# Patient Record
Sex: FEMALE | Race: WHITE | NOT HISPANIC OR LATINO | Employment: FULL TIME | URBAN - METROPOLITAN AREA
[De-identification: names, ages, dates, MRNs, and addresses within clinical notes are randomized per-mention and may not be internally consistent; named-entity substitution may affect disease eponyms.]

---

## 2020-07-14 ENCOUNTER — VIRTUAL VISIT (OUTPATIENT)
Dept: FAMILY MEDICINE | Facility: OTHER | Age: 25
End: 2020-07-14

## 2020-07-17 NOTE — PROGRESS NOTES
"Date: 2020 18:12:41  Clinician: Tyrel Garcia  Clinician NPI: [id number]  Patient: Katheryn Jang  Patient : 1995  Patient Address: [address] [place] [id number]  Patient Phone: [phone]  Visit Protocol: URI  Patient Summary:  Katheryn is a 25 year old ( : 1995 ) female who initiated a Visit for COVID-19 (Coronavirus) evaluation and screening. When asked the question \"Please sign me up to receive news, health information and promotions. \", Katheryn responded \"No\".    Katheryn states her symptoms started 1-2 days ago.   Her symptoms consist of wheezing, malaise, chills, a sore throat, and myalgia. She is experiencing mild difficulty breathing with activities but can speak normally in full sentences. Katheryn also feels feverish but was unable to measure her temperature.   Symptom details     Sore throat: Katheryn reports having mild throat pain (1-3 on a 10 point pain scale), does not have exudate on her tonsils, and can swallow liquids. The lymph nodes in her neck are not enlarged. A rash has not appeared on the skin since the sore throat started.     Wheezing: Katheryn has not ever been diagnosed with asthma. Additional wheezing details as reported by the patient (free text): very mild only when I breathe deeply        Katheryn denies having nausea, teeth pain, ageusia, diarrhea, vomiting, rhinitis, ear pain, headache, enlarged lymph nodes, anosmia, facial pain or pressure, cough, and nasal congestion. She also denies having recent facial or sinus surgery in the past 60 days and taking antibiotic medication in the past month.   Precipitating events  Within the past week, Katheryn has not been exposed to someone with strep throat. She has not recently been exposed to someone with influenza. Katheryn has not been in close contact with any high risk individuals.   Pertinent COVID-19 (Coronavirus) information  In the past 14 days, Katheryn has not worked in a congregate living " setting.   She does not work or volunteer as healthcare worker or a  and does not work or volunteer in a healthcare facility.   Katheryn also has not lived in a congregate living setting in the past 14 days. She lives with a healthcare worker.   Katheryn has not had a close contact with a laboratory-confirmed COVID-19 patient within 14 days of symptom onset.   Pertinent medical history  Katheryn typically gets a yeast infection when she takes antibiotics. She is not sure if she has used fluconazole (Diflucan) to treat previous yeast infections.   Katheryn needs a return to work/school note.   Weight: 153 lbs   Katheryn does not smoke or use smokeless tobacco.   She denies pregnancy and denies breastfeeding. She has menstruated in the past month.   Weight: 153 lbs    MEDICATIONS: Flonase Allergy Relief nasal, Liletta intrauterine, ALLERGIES: NKDA  Clinician Response:  Dear Katheryn,   Your symptoms show that you may have coronavirus (COVID-19). This illness can cause fever, cough and trouble breathing. Many people get a mild case and get better on their own. Some people can get very sick.  What should I do?  We would like to test you for this virus.   1. Please call [phone] to schedule your visit. Explain that you were referred by [facility] to have a COVID-19 test. Be ready to share your [facility] visit ID number.  The following will serve as your written order for this COVID Test, ordered by me, for the indication of suspected COVID [Z20.828]: The test will be ordered in [employer], our electronic health record, after you are scheduled. It will show as ordered and authorized by Giuseppe Douglas MD.  Order: COVID-19 (Coronavirus) PCR for SYMPTOMATIC testing from [facility].      2. When it's time for your COVID test:  Stay at least 6 feet away from others. (If someone will drive you to your test, stay in the backseat, as far away from the  as you can.)   Cover your mouth and nose with a mask, tissue or  "washcloth.  Go straight to the testing site. Don't make any stops on the way there or back.      3.Starting now: Stay home and away from others (self-isolate) until:   You've had no fever---and no medicine that reduces fever---for 3 full days (72 hours). And...   Your other symptoms have gotten better. For example, your cough or breathing has improved. And...   At least 10 days have passed since your symptoms started.       During this time, don't leave the house except for testing or medical care.   Stay in your own room, even for meals. Use your own bathroom if you can.   Stay away from others in your home. No hugging, kissing or shaking hands. No visitors.  Don't go to work, school or anywhere else.    Clean \"high touch\" surfaces often (doorknobs, counters, handles, etc.). Use a household cleaning spray or wipes. You'll find a full list of  on the EPA website: www.epa.gov/pesticide-registration/list-n-disinfectants-use-against-sars-cov-2.   Cover your mouth and nose with a mask, tissue or washcloth to avoid spreading germs.  Wash your hands and face often. Use soap and water.  Caregivers in these groups are at risk for severe illness due to COVID-19:  o People 65 years and older  o People who live in a nursing home or long-term care facility  o People with chronic disease (lung, heart, cancer, diabetes, kidney, liver, immunologic)  o People who have a weakened immune system, including those who:   Are in cancer treatment  Take medicine that weakens the immune system, such as corticosteroids  Had a bone marrow or organ transplant  Have an immune deficiency  Have poorly controlled HIV or AIDS  Are obese (body mass index of 40 or higher)  Smoke regularly   o Caregivers should wear gloves while washing dishes, handling laundry and cleaning bedrooms and bathrooms.  o Use caution when washing and drying laundry: Don't shake dirty laundry, and use the warmest water setting that you can.  o For more tips, go to " www.cdc.gov/coronavirus/2019-ncov/downloads/10Things.pdf.    4.Sign up for [employer]. We know it's scary to hear that you might have COVID-19. We want to track your symptoms to make sure you're okay over the next 2 weeks. Please look for an email from [employer]---this is a free, online program that we'll use to keep in touch. To sign up, follow the link in the email. Learn more at [email or web address]  How can I take care of myself?   Get lots of rest. Drink extra fluids (unless a doctor has told you not to).   Take Tylenol (acetaminophen) for fever or pain. If you have liver or kidney problems, ask your family doctor if it's okay to take Tylenol.   Adults can take either:    650 mg (two 325 mg pills) every 4 to 6 hours, or...   1,000 mg (two 500 mg pills) every 8 hours as needed.    Note: Don't take more than 3,000 mg in one day. Acetaminophen is found in many medicines (both prescribed and over-the-counter medicines). Read all labels to be sure you don't take too much.   For children, check the Tylenol bottle for the right dose. The dose is based on the child's age or weight.    If you have other health problems (like cancer, heart failure, an organ transplant or severe kidney disease): Call your specialty clinic if you don't feel better in the next 2 days.       Know when to call 911. Emergency warning signs include:    Trouble breathing or shortness of breath Pain or pressure in the chest that doesn't go away Feeling confused like you haven't felt before, or not being able to wake up Bluish-colored lips or face.  Where can I get more information?    cloud[employer] [facility] -- About COVID-19: [email or web address]   CDC -- What to Do If You're Sick: www.cdc.gov/coronavirus/2019-ncov/about/steps-when-sick.html   CDC -- Ending Home Isolation: www.cdc.gov/coronavirus/2019-ncov/hcp/disposition-in-home-patients.html   CDC -- Caring for Someone:  www.cdc.gov/coronavirus/2019-ncov/if-you-are-sick/care-for-someone.html   [facility] -- Interim Guidance for Hospital Discharge to Home: www.health.[facility].mn.us/diseases/coronavirus/hcp/hospdischarge.pdf   [facility] clinical trials (COVID-19 research studies): clinicalaffairs.[facility]-clinical-trials    Below are the COVID-19 hotlines at the [place] Department of Health ([facility]). Interpreters are available.    For health questions: Call 010-692-8573 or 1-799.323.7795 (7 a.m. to 7 p.m.) For questions about schools and childcare: Call 013-920-6991 or 1-797.396.4274 (7 a.m. to 7 p.m.)       [facility] Strep [facility]    I am sorry you are not feeling well. Your strep test has . Based on the information provided, it is possible that you could have strep throat. When left untreated, strep can spread to other areas of the body and cause a more serious infection.  A strep test is the only way to determine if a bacterial infection or a virus is causing your sore throat. This is done in a lab where a swab of the back of your throat is collected tested for the bacteria that causes strep.  Since you chose not to use the lab order, please be seen:     In a clinic or urgent care    Within 24 hours     Call 081 or go to the emergency room if you suddenly develop a rash, are drooling or unable to swallow fluids, if you are having difficulty breathing, or feel that your throat is closing off.   Diagnosis: Pain in throat  Diagnosis ICD: R07.0  [facility] Results: [facility] -   [facility] Secondary Results: null

## 2022-11-03 ENCOUNTER — HOSPITAL ENCOUNTER (OUTPATIENT)
Facility: CLINIC | Age: 27
End: 2022-11-03
Admitting: SPECIALIST

## 2022-11-03 ENCOUNTER — HOSPITAL ENCOUNTER (OUTPATIENT)
Facility: CLINIC | Age: 27
Discharge: HOME | End: 2022-11-03
Attending: SPECIALIST | Admitting: SPECIALIST
Payer: COMMERCIAL

## 2022-11-03 VITALS
RESPIRATION RATE: 16 BRPM | WEIGHT: 154 LBS | BODY MASS INDEX: 24.17 KG/M2 | TEMPERATURE: 98.4 F | DIASTOLIC BLOOD PRESSURE: 58 MMHG | SYSTOLIC BLOOD PRESSURE: 105 MMHG | HEIGHT: 67 IN | HEART RATE: 77 BPM

## 2022-11-03 LAB
ALBUMIN SERPL-MCNC: 3.9 G/DL (ref 3.4–5)
ALP SERPL-CCNC: 40 U/L (ref 40–150)
ALT SERPL W P-5'-P-CCNC: 29 U/L (ref 0–50)
ANION GAP SERPL CALCULATED.3IONS-SCNC: 3 MMOL/L (ref 3–14)
AST SERPL W P-5'-P-CCNC: 12 U/L (ref 0–45)
B-HCG SERPL-ACNC: 574 IU/L (ref 0–5)
BILIRUB SERPL-MCNC: 0.9 MG/DL (ref 0.2–1.3)
BUN SERPL-MCNC: 11 MG/DL (ref 7–30)
CALCIUM SERPL-MCNC: 8.9 MG/DL (ref 8.5–10.1)
CHLORIDE BLD-SCNC: 104 MMOL/L (ref 94–109)
CO2 SERPL-SCNC: 31 MMOL/L (ref 20–32)
CREAT SERPL-MCNC: 0.82 MG/DL (ref 0.52–1.04)
ERYTHROCYTE [DISTWIDTH] IN BLOOD BY AUTOMATED COUNT: 12 % (ref 10–15)
GFR SERPL CREATININE-BSD FRML MDRD: >90 ML/MIN/1.73M2
GLUCOSE BLD-MCNC: 96 MG/DL (ref 70–99)
HCT VFR BLD AUTO: 37.9 % (ref 35–47)
HGB BLD-MCNC: 13.5 G/DL (ref 11.7–15.7)
MCH RBC QN AUTO: 30.6 PG (ref 26.5–33)
MCHC RBC AUTO-ENTMCNC: 35.6 G/DL (ref 31.5–36.5)
MCV RBC AUTO: 86 FL (ref 78–100)
PLATELET # BLD AUTO: 173 10E3/UL (ref 150–450)
POTASSIUM BLD-SCNC: 3.7 MMOL/L (ref 3.4–5.3)
PROT SERPL-MCNC: 6.9 G/DL (ref 6.8–8.8)
RBC # BLD AUTO: 4.41 10E6/UL (ref 3.8–5.2)
SODIUM SERPL-SCNC: 138 MMOL/L (ref 133–144)
WBC # BLD AUTO: 5.3 10E3/UL (ref 4–11)

## 2022-11-03 PROCEDURE — 80053 COMPREHEN METABOLIC PANEL: CPT | Performed by: SPECIALIST

## 2022-11-03 PROCEDURE — 85027 COMPLETE CBC AUTOMATED: CPT | Performed by: SPECIALIST

## 2022-11-03 PROCEDURE — 96401 CHEMO ANTI-NEOPL SQ/IM: CPT

## 2022-11-03 PROCEDURE — 36415 COLL VENOUS BLD VENIPUNCTURE: CPT | Performed by: SPECIALIST

## 2022-11-03 PROCEDURE — 84702 CHORIONIC GONADOTROPIN TEST: CPT | Performed by: SPECIALIST

## 2022-11-03 PROCEDURE — 250N000011 HC RX IP 250 OP 636: Performed by: SPECIALIST

## 2022-11-03 RX ORDER — FEXOFENADINE HCL 180 MG/1
180 TABLET ORAL DAILY
COMMUNITY

## 2022-11-03 RX ORDER — METHOTREXATE 25 MG/ML
50 INJECTION INTRA-ARTERIAL; INTRAMUSCULAR; INTRATHECAL; INTRAVENOUS ONCE
Status: COMPLETED | OUTPATIENT
Start: 2022-11-03 | End: 2022-11-03

## 2022-11-03 RX ADMIN — METHOTREXATE 91 MG: 25 SOLUTION INTRA-ARTERIAL; INTRAMUSCULAR; INTRATHECAL; INTRAVENOUS at 16:36

## 2022-11-03 ASSESSMENT — ACTIVITIES OF DAILY LIVING (ADL)
ADLS_ACUITY_SCORE: 31
ADLS_ACUITY_SCORE: 31

## 2022-11-03 NOTE — PLAN OF CARE
Methotrexate given per order. discharge instructions given and reviewed with pt and spouse. Questions answered. Verbalized understanding. Will F/U in clinic oon Monday for repeat Hcg. discharge home after 15 minutes of observation. See flow sheets

## 2022-11-03 NOTE — CARE PLAN
Katheryn presents to [facility] ambulatory for administration of Methotrexate injection, accompanied by spouse. Orders received by Dr Rowan. AVSS. Awaiting laboratory results for dosing. Plan of care reviewed with patient.

## 2022-11-03 NOTE — DISCHARGE INSTRUCTIONS
Methotrexate for Ectopic Pregnancy  An ectopic pregnancy means the baby is growing in the outside of the uterus, most commonly in the fallopian tube. The fetus can't survive in the fallopian tube. There is no way to save the fetus in this situation. An ectopic pregnancy is a very serious condition. It can lead to severe internal bleeding as the growing fetus tears the fallopian tube. It can also threaten the life of the mother.  Methotrexate is a medicine given  for ectopic pregnancy. The medicine stops the fetus from growing. The mother's body then usually absorbs the fetal tissue. Methotrexate is an alternative to surgery. The advantage of this treatment is that it avoids the risks of surgery. These risks include bleeding, infection, injury to the body, and the side effects of anesthesia. The surgery removes the fetus from the fallopian tube or removes the fallopian tube itself. If surgery removes the fetus only, you may have scarring of the fallopian tube and infertility.  Methotrexate is given by injection. After treatment, you may have mild abdominal pain or cramping. Some women also have nausea and vomiting, diarrhea, or fatigue.  There are some cases when you can't use methotrexate. Be sure to tell your healthcare provider if you have other health conditions, especially peptic ulcers, pulmonary diseases, kidney disease, or liver disease.  You will have your blood taken several times in the weeks after the methotrexate injection. This is to make sure your pregnancy hormone level (HCG) is getting lower. This shows that the pregnancy has ended and the fetus is no longer growing. It may take about 4 weeks for your level to drop to zero.  Most women need only one injection. But you may need more than one, depending on your situation.  Home care    These guidelines will help you care for yourself at home:  You may resume normal activities if you don t have heavy bleeding or pain.  To prevent infection during  treatment and until the bleeding completely stops:  Don t have sex for as long as your healthcare provider tells you.  Use sanitary pads instead of tampons.  Don t douche.  Don t use alcohol, any vitamins with folic acid (vitamin B-9), or penicillin until your HCG level is back to zero.  Don t take aspirin or other anti-inflammatory medicines such as ibuprofen and naproxen for 1 week after methotrexate treatment or until your healthcare provider says it s OK.  You may use acetaminophen to control pain, unless another pain-relieving medicine was prescribed. Talk with your provider before using these medicines if you have chronic liver disease.  Don't eat gas-producing foods such as beans and cabbage. These might make abdominal pain worse.  Stay out of the sun during treatment. Methotrexate may cause you to be sensitive to the sun.  Use birth control for 4 to 6 months after treatment or as long as your healthcare provider tells you to.  If you feel sadness or grief after pregnancy loss, it may help to talk about your feelings with family and friends, or with a counselor.  Follow-up care  Follow up with your healthcare provider, or as advised for repeat HCG blood testing.  When to seek medical advice  Call your healthcare provider right away if any of these occur:  Pain in your lower abdomen that gets worse  Heavy vaginal bleeding (soaking 1 new pad an hour over 3 hours)  Repeated vomiting or unable to keep down fluids because of nausea  Dizziness, weakness, or fainting  Fever of 100.4 F (38 C) or higher, or as directed by your healthcare provider  James last reviewed this educational content on 9/1/2019 2000-2021 [employer]. All rights reserved. This information is not intended as a substitute for professional medical care. Always follow your healthcare professional's instructions.

## 2024-02-14 LAB
HEPATITIS B SURFACE ANTIGEN (EXTERNAL): NEGATIVE
RUBELLA ANTIBODY IGG (EXTERNAL): NORMAL

## 2024-09-03 LAB — GROUP B STREPTOCOCCUS (EXTERNAL): NEGATIVE

## 2024-09-16 ENCOUNTER — HOSPITAL ENCOUNTER (INPATIENT)
Facility: CLINIC | Age: 29
LOS: 4 days | Discharge: HOME HEALTH | End: 2024-09-20
Attending: SPECIALIST | Admitting: SPECIALIST
Payer: COMMERCIAL

## 2024-09-16 PROBLEM — O13.3 GESTATIONAL HYPERTENSION, THIRD TRIMESTER: Status: ACTIVE | Noted: 2024-09-16

## 2024-09-16 LAB
ABO/RH(D): NORMAL
ALT SERPL W P-5'-P-CCNC: 25 U/L (ref 0–50)
ANTIBODY SCREEN: NEGATIVE
AST SERPL W P-5'-P-CCNC: 32 U/L (ref 0–45)
ERYTHROCYTE [DISTWIDTH] IN BLOOD BY AUTOMATED COUNT: 12.5 % (ref 10–15)
HCT VFR BLD AUTO: 36.6 % (ref 35–47)
HGB BLD-MCNC: 12.6 G/DL (ref 11.7–15.7)
MCH RBC QN AUTO: 30.2 PG (ref 26.5–33)
MCHC RBC AUTO-ENTMCNC: 34.4 G/DL (ref 31.5–36.5)
MCV RBC AUTO: 88 FL (ref 78–100)
PLATELET # BLD AUTO: 116 10E3/UL (ref 150–450)
RBC # BLD AUTO: 4.17 10E6/UL (ref 3.8–5.2)
SPECIMEN EXPIRATION DATE: NORMAL
WBC # BLD AUTO: 8 10E3/UL (ref 4–11)

## 2024-09-16 PROCEDURE — 84450 TRANSFERASE (AST) (SGOT): CPT | Performed by: SPECIALIST

## 2024-09-16 PROCEDURE — 84460 ALANINE AMINO (ALT) (SGPT): CPT | Performed by: SPECIALIST

## 2024-09-16 PROCEDURE — 120N000001 HC R&B MED SURG/OB

## 2024-09-16 PROCEDURE — 86900 BLOOD TYPING SEROLOGIC ABO: CPT | Performed by: SPECIALIST

## 2024-09-16 PROCEDURE — 250N000013 HC RX MED GY IP 250 OP 250 PS 637: Performed by: SPECIALIST

## 2024-09-16 PROCEDURE — 36415 COLL VENOUS BLD VENIPUNCTURE: CPT | Performed by: SPECIALIST

## 2024-09-16 PROCEDURE — 85027 COMPLETE CBC AUTOMATED: CPT | Performed by: SPECIALIST

## 2024-09-16 RX ORDER — ONDANSETRON 4 MG/1
4 TABLET, ORALLY DISINTEGRATING ORAL EVERY 6 HOURS PRN
Status: DISCONTINUED | OUTPATIENT
Start: 2024-09-16 | End: 2024-09-18 | Stop reason: HOSPADM

## 2024-09-16 RX ORDER — CITRIC ACID/SODIUM CITRATE 334-500MG
30 SOLUTION, ORAL ORAL
Status: DISCONTINUED | OUTPATIENT
Start: 2024-09-16 | End: 2024-09-18 | Stop reason: HOSPADM

## 2024-09-16 RX ORDER — MISOPROSTOL 100 UG/1
25 TABLET ORAL
Status: DISCONTINUED | OUTPATIENT
Start: 2024-09-16 | End: 2024-09-16

## 2024-09-16 RX ORDER — LOPERAMIDE HCL 2 MG
4 CAPSULE ORAL
Status: DISCONTINUED | OUTPATIENT
Start: 2024-09-16 | End: 2024-09-18 | Stop reason: HOSPADM

## 2024-09-16 RX ORDER — NALOXONE HYDROCHLORIDE 0.4 MG/ML
0.4 INJECTION, SOLUTION INTRAMUSCULAR; INTRAVENOUS; SUBCUTANEOUS
Status: DISCONTINUED | OUTPATIENT
Start: 2024-09-16 | End: 2024-09-18 | Stop reason: HOSPADM

## 2024-09-16 RX ORDER — MISOPROSTOL 200 UG/1
800 TABLET ORAL
Status: DISCONTINUED | OUTPATIENT
Start: 2024-09-16 | End: 2024-09-18 | Stop reason: HOSPADM

## 2024-09-16 RX ORDER — OXYTOCIN 10 [USP'U]/ML
10 INJECTION, SOLUTION INTRAMUSCULAR; INTRAVENOUS
Status: DISCONTINUED | OUTPATIENT
Start: 2024-09-16 | End: 2024-09-18 | Stop reason: HOSPADM

## 2024-09-16 RX ORDER — TRANEXAMIC ACID 10 MG/ML
1 INJECTION, SOLUTION INTRAVENOUS EVERY 30 MIN PRN
Status: DISCONTINUED | OUTPATIENT
Start: 2024-09-16 | End: 2024-09-18 | Stop reason: HOSPADM

## 2024-09-16 RX ORDER — MISOPROSTOL 200 UG/1
400 TABLET ORAL
Status: DISCONTINUED | OUTPATIENT
Start: 2024-09-16 | End: 2024-09-18 | Stop reason: HOSPADM

## 2024-09-16 RX ORDER — METOCLOPRAMIDE HYDROCHLORIDE 5 MG/ML
10 INJECTION INTRAMUSCULAR; INTRAVENOUS EVERY 6 HOURS PRN
Status: DISCONTINUED | OUTPATIENT
Start: 2024-09-16 | End: 2024-09-18 | Stop reason: HOSPADM

## 2024-09-16 RX ORDER — OXYTOCIN 10 [USP'U]/ML
10 INJECTION, SOLUTION INTRAMUSCULAR; INTRAVENOUS
Status: DISCONTINUED | OUTPATIENT
Start: 2024-09-16 | End: 2024-09-20 | Stop reason: HOSPADM

## 2024-09-16 RX ORDER — LABETALOL 100 MG/1
100 TABLET, FILM COATED ORAL 3 TIMES DAILY
Status: ON HOLD | COMMUNITY
End: 2024-09-20

## 2024-09-16 RX ORDER — METOCLOPRAMIDE 10 MG/1
10 TABLET ORAL EVERY 6 HOURS PRN
Status: DISCONTINUED | OUTPATIENT
Start: 2024-09-16 | End: 2024-09-18 | Stop reason: HOSPADM

## 2024-09-16 RX ORDER — CALCIUM CARBONATE 500 MG/1
500 TABLET, CHEWABLE ORAL 3 TIMES DAILY PRN
Status: DISCONTINUED | OUTPATIENT
Start: 2024-09-16 | End: 2024-09-20 | Stop reason: HOSPADM

## 2024-09-16 RX ORDER — PROCHLORPERAZINE MALEATE 10 MG
10 TABLET ORAL EVERY 6 HOURS PRN
Status: DISCONTINUED | OUTPATIENT
Start: 2024-09-16 | End: 2024-09-18 | Stop reason: HOSPADM

## 2024-09-16 RX ORDER — ONDANSETRON 2 MG/ML
4 INJECTION INTRAMUSCULAR; INTRAVENOUS EVERY 6 HOURS PRN
Status: DISCONTINUED | OUTPATIENT
Start: 2024-09-16 | End: 2024-09-18 | Stop reason: HOSPADM

## 2024-09-16 RX ORDER — OXYTOCIN/0.9 % SODIUM CHLORIDE 30/500 ML
340 PLASTIC BAG, INJECTION (ML) INTRAVENOUS CONTINUOUS PRN
Status: DISCONTINUED | OUTPATIENT
Start: 2024-09-16 | End: 2024-09-18 | Stop reason: HOSPADM

## 2024-09-16 RX ORDER — SODIUM CHLORIDE, SODIUM LACTATE, POTASSIUM CHLORIDE, CALCIUM CHLORIDE 600; 310; 30; 20 MG/100ML; MG/100ML; MG/100ML; MG/100ML
INJECTION, SOLUTION INTRAVENOUS CONTINUOUS
Status: DISCONTINUED | OUTPATIENT
Start: 2024-09-16 | End: 2024-09-18 | Stop reason: HOSPADM

## 2024-09-16 RX ORDER — KETOROLAC TROMETHAMINE 30 MG/ML
30 INJECTION, SOLUTION INTRAMUSCULAR; INTRAVENOUS
Status: COMPLETED | OUTPATIENT
Start: 2024-09-16 | End: 2024-09-18

## 2024-09-16 RX ORDER — METHYLERGONOVINE MALEATE 0.2 MG/ML
200 INJECTION INTRAVENOUS
Status: DISCONTINUED | OUTPATIENT
Start: 2024-09-16 | End: 2024-09-18 | Stop reason: HOSPADM

## 2024-09-16 RX ORDER — IBUPROFEN 400 MG/1
800 TABLET, FILM COATED ORAL
Status: COMPLETED | OUTPATIENT
Start: 2024-09-16 | End: 2024-09-18

## 2024-09-16 RX ORDER — CARBOPROST TROMETHAMINE 250 UG/ML
250 INJECTION, SOLUTION INTRAMUSCULAR
Status: DISCONTINUED | OUTPATIENT
Start: 2024-09-16 | End: 2024-09-18 | Stop reason: HOSPADM

## 2024-09-16 RX ORDER — LABETALOL 100 MG/1
100 TABLET, FILM COATED ORAL EVERY 8 HOURS SCHEDULED
Status: DISCONTINUED | OUTPATIENT
Start: 2024-09-16 | End: 2024-09-18

## 2024-09-16 RX ORDER — NALOXONE HYDROCHLORIDE 0.4 MG/ML
0.2 INJECTION, SOLUTION INTRAMUSCULAR; INTRAVENOUS; SUBCUTANEOUS
Status: DISCONTINUED | OUTPATIENT
Start: 2024-09-16 | End: 2024-09-18 | Stop reason: HOSPADM

## 2024-09-16 RX ORDER — PANTOPRAZOLE SODIUM 40 MG/1
40 TABLET, DELAYED RELEASE ORAL EVERY EVENING
Status: DISCONTINUED | OUTPATIENT
Start: 2024-09-16 | End: 2024-09-20 | Stop reason: HOSPADM

## 2024-09-16 RX ORDER — HYDROXYZINE HYDROCHLORIDE 25 MG/1
50 TABLET, FILM COATED ORAL
Status: DISCONTINUED | OUTPATIENT
Start: 2024-09-16 | End: 2024-09-18 | Stop reason: HOSPADM

## 2024-09-16 RX ORDER — LOPERAMIDE HCL 2 MG
2 CAPSULE ORAL
Status: DISCONTINUED | OUTPATIENT
Start: 2024-09-16 | End: 2024-09-18 | Stop reason: HOSPADM

## 2024-09-16 RX ORDER — PROCHLORPERAZINE 25 MG
25 SUPPOSITORY, RECTAL RECTAL EVERY 12 HOURS PRN
Status: DISCONTINUED | OUTPATIENT
Start: 2024-09-16 | End: 2024-09-18 | Stop reason: HOSPADM

## 2024-09-16 RX ORDER — OMEPRAZOLE 10 MG/1
10 CAPSULE, DELAYED RELEASE ORAL DAILY
Status: ON HOLD | COMMUNITY
End: 2024-09-20

## 2024-09-16 RX ORDER — MISOPROSTOL 100 UG/1
25 TABLET ORAL EVERY 4 HOURS PRN
Status: COMPLETED | OUTPATIENT
Start: 2024-09-16 | End: 2024-09-17

## 2024-09-16 RX ORDER — OXYTOCIN/0.9 % SODIUM CHLORIDE 30/500 ML
100-340 PLASTIC BAG, INJECTION (ML) INTRAVENOUS CONTINUOUS PRN
Status: DISCONTINUED | OUTPATIENT
Start: 2024-09-16 | End: 2024-09-20 | Stop reason: HOSPADM

## 2024-09-16 RX ADMIN — MISOPROSTOL 25 MCG: 100 TABLET ORAL at 21:00

## 2024-09-16 RX ADMIN — LABETALOL HYDROCHLORIDE 100 MG: 100 TABLET, FILM COATED ORAL at 22:04

## 2024-09-16 RX ADMIN — HYDROXYZINE HYDROCHLORIDE 50 MG: 25 TABLET, FILM COATED ORAL at 22:05

## 2024-09-16 RX ADMIN — PANTOPRAZOLE SODIUM 40 MG: 40 TABLET, DELAYED RELEASE ORAL at 22:05

## 2024-09-16 ASSESSMENT — ACTIVITIES OF DAILY LIVING (ADL)
ADLS_ACUITY_SCORE: 35
ADLS_ACUITY_SCORE: 18
ADLS_ACUITY_SCORE: 35
ADLS_ACUITY_SCORE: 18

## 2024-09-17 PROCEDURE — 120N000001 HC R&B MED SURG/OB

## 2024-09-17 PROCEDURE — 250N000013 HC RX MED GY IP 250 OP 250 PS 637: Performed by: SPECIALIST

## 2024-09-17 PROCEDURE — 258N000003 HC RX IP 258 OP 636: Performed by: SPECIALIST

## 2024-09-17 PROCEDURE — 250N000009 HC RX 250: Performed by: SPECIALIST

## 2024-09-17 RX ORDER — OXYTOCIN/0.9 % SODIUM CHLORIDE 30/500 ML
1-24 PLASTIC BAG, INJECTION (ML) INTRAVENOUS CONTINUOUS
Status: DISCONTINUED | OUTPATIENT
Start: 2024-09-17 | End: 2024-09-18 | Stop reason: HOSPADM

## 2024-09-17 RX ORDER — SODIUM CHLORIDE, SODIUM LACTATE, POTASSIUM CHLORIDE, CALCIUM CHLORIDE 600; 310; 30; 20 MG/100ML; MG/100ML; MG/100ML; MG/100ML
INJECTION, SOLUTION INTRAVENOUS CONTINUOUS PRN
Status: DISCONTINUED | OUTPATIENT
Start: 2024-09-17 | End: 2024-09-18 | Stop reason: HOSPADM

## 2024-09-17 RX ADMIN — SODIUM CHLORIDE, POTASSIUM CHLORIDE, SODIUM LACTATE AND CALCIUM CHLORIDE: 600; 310; 30; 20 INJECTION, SOLUTION INTRAVENOUS at 22:06

## 2024-09-17 RX ADMIN — MISOPROSTOL 25 MCG: 100 TABLET ORAL at 01:00

## 2024-09-17 RX ADMIN — MISOPROSTOL 25 MCG: 100 TABLET ORAL at 18:04

## 2024-09-17 RX ADMIN — MISOPROSTOL 25 MCG: 100 TABLET ORAL at 13:53

## 2024-09-17 RX ADMIN — HYDROXYZINE HYDROCHLORIDE 50 MG: 25 TABLET, FILM COATED ORAL at 22:02

## 2024-09-17 RX ADMIN — PANTOPRAZOLE SODIUM 40 MG: 40 TABLET, DELAYED RELEASE ORAL at 22:03

## 2024-09-17 RX ADMIN — HYDROXYZINE HYDROCHLORIDE 50 MG: 25 TABLET, FILM COATED ORAL at 01:10

## 2024-09-17 RX ADMIN — MISOPROSTOL 25 MCG: 100 TABLET ORAL at 04:58

## 2024-09-17 RX ADMIN — MISOPROSTOL 25 MCG: 100 TABLET ORAL at 09:17

## 2024-09-17 RX ADMIN — LABETALOL HYDROCHLORIDE 100 MG: 100 TABLET, FILM COATED ORAL at 13:54

## 2024-09-17 RX ADMIN — Medication 2 MILLI-UNITS/MIN: at 22:07

## 2024-09-17 ASSESSMENT — ACTIVITIES OF DAILY LIVING (ADL)
ADLS_ACUITY_SCORE: 18

## 2024-09-17 NOTE — H&P
"  2024    Katheryn Brennan  [id number]            OB Admit History & Physical      Ms. Brennan  is here for induction of labor.  Indication gestational hypertension.    She has noticed some lower pelvic cramping and frequent bowel movements today.     No LMP recorded.   Her Estimated Date of Delivery: Data Unavailable  , making her Unknown  wks.     EDC 10/4/24    Diagnosed with gestational hypertension at 33 weeks.   Blood pressures well managed with labetalol 100 mg tid.   Most recent US demonstrated growth at 27%.    Estimated body mass index is 24.12 kg/m  as calculated from the following:    Height as of 11/3/22: 1.702 m (5' 7\").    Weight as of 11/3/22: 69.9 kg (154 lb).  Her prenatal course has been  complicated by gestational hypertension. .  Also with marginal cord insertion on 20 week scan.     See prenatal for labs.  neg GBBS, Rubella  Immune, RH pos    Estimated fetal weight= 3000 Wigm       She is a 29 year old   Her OB history:   OB History    Para Term  AB Living   1 0 0 0 0 0   SAB IAB Ectopic Multiple Live Births   0 0 0 0 0      # Outcome Date GA Lbr Osorio/2nd Weight Sex Type Anes PTL Lv   1                    No past medical history on file.       Past Surgical History:   Procedure Laterality Date    ENT SURGERY      Sinus and wisdom teetch    ORTHOPEDIC SURGERY Left     acl repair         No current outpatient medications on file.       Allergies: Patient has no known allergies.      REVIEW OF SYSTEMS:  NEUROLOGIC:  Negative  EYES:  Negative  ENT:  Negative  GI:  Negative  BREAST:  Negative  :  Negative  GYN:  Negative  CV:  Negative  PULMONARY:  Negative  MUSCULOSKELETAL:  Negative  PSYCH:  Negative        Social History     Socioeconomic History    Marital status:      Spouse name: Not on file    Number of children: Not on file    Years of education: Not on file    Highest education level: Not on file   Occupational History    Not on file "   Tobacco Use    Smoking status: Never    Smokeless tobacco: Never   Substance and Sexual Activity    Alcohol use: Yes    Drug use: Never    Sexual activity: Yes   Other Topics Concern    Not on file   Social History Narrative    Not on file     Social Determinants of Health     Financial Resource Strain: Not on file   Food Insecurity: Not on file   Transportation Needs: Not on file   Physical Activity: Not on file   Stress: Not on file   Social Connections: Not on file   Interpersonal Safety: Not on file   Housing Stability: Not on file      No family history on file.          Vitals:   's  With minimal contractions    Alert Awake in NAD  HEENT grossly normal  Neck: no lymphadenopathy or thryoidomegaly  Lungs WNL  Back no spinal or CVAT  Heart WNL  ABD gravid, vertex on exam   Pelvic:  no fluid noted, no blood noted  Cervix is closed/60-2 cm   EXT:  no edema or calf tenderness  Neuro:  WNL    Assessment:  IUP at 37 3/7 weeks gestation  Gestational hypertension.     Plan:  Will begin vaginal cytotec for ripening.   Preeclampsia labs.       Gia Barba MD MD  Dept of OB/GYN  September 16, 2024

## 2024-09-17 NOTE — PROVIDER NOTIFICATION
09/17/24 1810   Provider Notification   Provider Name/Title MD Barba   Method of Notification Electronic Page   Request Evaluate - Remote   Notification Reason Status Update;SVE;Labor Status;Uterine Activity     MD Barba updated on pt condition- SVE remains 1/60/-2, RN gave last dose of vaginal misoprostol. Pt zeferino Q4 min, states that they are becoming slightly more painful, however still able to sleep through them. Having minor bloody show when wiping. VSS on RA. Cat I FHR tracing

## 2024-09-17 NOTE — PLAN OF CARE
"3rd dose vaginal Cytotec given at 0500. Ctx on toco are 2-4 min. Ctx palpate mild. Pt states feeling intermittent back pain and mild \"period like\" ctx. Cat 1 FHT's. Accels, no decels with mod variability.     Patient states being able to sleep very well throughout the night.   "

## 2024-09-17 NOTE — PROGRESS NOTES
Labor Progress Note  S: Sleeping presently.   Has received 3 doses of vaginal cytotec.   Feeling mild cramping.     O./60   Temp 97.9  F (36.6  C) (Temporal)   Resp 16   SpO2 95%       Abd: soft, non tneder      Jeddito: contractions q2-3 min       Category 1 tracing.        Plt 116       Hgb 12.6       AST 32       ALT 25         Cervix closed per RN exam           A. Gestational hypertension.        37 4/7 weeks gestation    P.   Continue with vaginal cytotec.     Gia Barba MD on 9/17/2024 at 7:31 AM

## 2024-09-17 NOTE — PLAN OF CARE
Patient admitted to room 215 at 1930. Patient is a  at 37w3d. Prenatal record reviewed. Patient presents for cervical ripening due to Gest HTN. Vital signs per doc flowsheet. Fetal movement present. Support person Efren present.    Verbal consent for EFM, external fetal monitors applied. Admission assessment completed. Patient and support persons educated on labor process. Patient instructed to report change in fetal movement, contractions, vaginal leaking of fluid or bleeding, abdominal pain, or any concerns related to the pregnancy to her nurse/physician. Patient oriented to room, call light in reach.      Dr. Barba at bedside at 1950 for SVE and  to discuss plan of care. Plan per provider is Vaginal cytotec. Patient verbalized understanding of education and verbalized agreement with plan.

## 2024-09-17 NOTE — PROVIDER NOTIFICATION
09/17/24 0935   Provider Notification   Provider Name/Title MD Barba   Method of Notification Electronic Page   Notification Reason Status Update     MD Barba updated on pt condition- SVE 1/60/-2, just received 4th dose of vaginal misoprostol. Pt tolerating contractions well, states that they feel like minor period cramps. FHR tracing has been Cat I throughout morning. Pt up and eating breakfast.

## 2024-09-18 ENCOUNTER — ANESTHESIA (OUTPATIENT)
Dept: OBGYN | Facility: CLINIC | Age: 29
End: 2024-09-18
Payer: COMMERCIAL

## 2024-09-18 ENCOUNTER — ANESTHESIA EVENT (OUTPATIENT)
Dept: OBGYN | Facility: CLINIC | Age: 29
End: 2024-09-18
Payer: COMMERCIAL

## 2024-09-18 LAB — PLATELET # BLD AUTO: 137 10E3/UL (ref 150–450)

## 2024-09-18 PROCEDURE — 3E0R3BZ INTRODUCTION OF ANESTHETIC AGENT INTO SPINAL CANAL, PERCUTANEOUS APPROACH: ICD-10-PCS | Performed by: ANESTHESIOLOGY

## 2024-09-18 PROCEDURE — 250N000011 HC RX IP 250 OP 636: Performed by: ANESTHESIOLOGY

## 2024-09-18 PROCEDURE — 85049 AUTOMATED PLATELET COUNT: CPT | Performed by: SPECIALIST

## 2024-09-18 PROCEDURE — 999N000016 HC STATISTIC ATTENDANCE AT DELIVERY

## 2024-09-18 PROCEDURE — 0UQMXZZ REPAIR VULVA, EXTERNAL APPROACH: ICD-10-PCS | Performed by: SPECIALIST

## 2024-09-18 PROCEDURE — 250N000013 HC RX MED GY IP 250 OP 250 PS 637: Performed by: SPECIALIST

## 2024-09-18 PROCEDURE — 00HU33Z INSERTION OF INFUSION DEVICE INTO SPINAL CANAL, PERCUTANEOUS APPROACH: ICD-10-PCS | Performed by: ANESTHESIOLOGY

## 2024-09-18 PROCEDURE — 258N000003 HC RX IP 258 OP 636: Performed by: SPECIALIST

## 2024-09-18 PROCEDURE — 59400 OBSTETRICAL CARE: CPT | Performed by: ANESTHESIOLOGY

## 2024-09-18 PROCEDURE — 0KQM0ZZ REPAIR PERINEUM MUSCLE, OPEN APPROACH: ICD-10-PCS | Performed by: SPECIALIST

## 2024-09-18 PROCEDURE — 3E0P7GC INTRODUCTION OF OTHER THERAPEUTIC SUBSTANCE INTO FEMALE REPRODUCTIVE, VIA NATURAL OR ARTIFICIAL OPENING: ICD-10-PCS | Performed by: SPECIALIST

## 2024-09-18 PROCEDURE — 722N000001 HC LABOR CARE VAGINAL DELIVERY SINGLE

## 2024-09-18 PROCEDURE — 120N000012 HC R&B POSTPARTUM

## 2024-09-18 PROCEDURE — 370N000003 HC ANESTHESIA WARD SERVICE: Performed by: ANESTHESIOLOGY

## 2024-09-18 PROCEDURE — 10907ZC DRAINAGE OF AMNIOTIC FLUID, THERAPEUTIC FROM PRODUCTS OF CONCEPTION, VIA NATURAL OR ARTIFICIAL OPENING: ICD-10-PCS | Performed by: SPECIALIST

## 2024-09-18 RX ORDER — CARBOPROST TROMETHAMINE 250 UG/ML
250 INJECTION, SOLUTION INTRAMUSCULAR
Status: DISCONTINUED | OUTPATIENT
Start: 2024-09-18 | End: 2024-09-20 | Stop reason: HOSPADM

## 2024-09-18 RX ORDER — EPHEDRINE SULFATE 50 MG/ML
5 INJECTION, SOLUTION INTRAMUSCULAR; INTRAVENOUS; SUBCUTANEOUS
Status: DISCONTINUED | OUTPATIENT
Start: 2024-09-18 | End: 2024-09-18 | Stop reason: HOSPADM

## 2024-09-18 RX ORDER — MISOPROSTOL 200 UG/1
400 TABLET ORAL
Status: DISCONTINUED | OUTPATIENT
Start: 2024-09-18 | End: 2024-09-20 | Stop reason: HOSPADM

## 2024-09-18 RX ORDER — LOPERAMIDE HCL 2 MG
4 CAPSULE ORAL
Status: DISCONTINUED | OUTPATIENT
Start: 2024-09-18 | End: 2024-09-20 | Stop reason: HOSPADM

## 2024-09-18 RX ORDER — OXYTOCIN 10 [USP'U]/ML
10 INJECTION, SOLUTION INTRAMUSCULAR; INTRAVENOUS
Status: DISCONTINUED | OUTPATIENT
Start: 2024-09-18 | End: 2024-09-20 | Stop reason: HOSPADM

## 2024-09-18 RX ORDER — NALOXONE HYDROCHLORIDE 0.4 MG/ML
0.4 INJECTION, SOLUTION INTRAMUSCULAR; INTRAVENOUS; SUBCUTANEOUS
Status: DISCONTINUED | OUTPATIENT
Start: 2024-09-18 | End: 2024-09-20 | Stop reason: HOSPADM

## 2024-09-18 RX ORDER — BISACODYL 10 MG
10 SUPPOSITORY, RECTAL RECTAL DAILY PRN
Status: DISCONTINUED | OUTPATIENT
Start: 2024-09-18 | End: 2024-09-20 | Stop reason: HOSPADM

## 2024-09-18 RX ORDER — MODIFIED LANOLIN
OINTMENT (GRAM) TOPICAL
Status: DISCONTINUED | OUTPATIENT
Start: 2024-09-18 | End: 2024-09-20 | Stop reason: HOSPADM

## 2024-09-18 RX ORDER — ONDANSETRON 4 MG/1
4 TABLET, ORALLY DISINTEGRATING ORAL EVERY 6 HOURS PRN
Status: DISCONTINUED | OUTPATIENT
Start: 2024-09-18 | End: 2024-09-18

## 2024-09-18 RX ORDER — ROPIVACAINE HYDROCHLORIDE 2 MG/ML
INJECTION, SOLUTION EPIDURAL; INFILTRATION; PERINEURAL
Status: COMPLETED | OUTPATIENT
Start: 2024-09-18 | End: 2024-09-18

## 2024-09-18 RX ORDER — DOCUSATE SODIUM 100 MG/1
100 CAPSULE, LIQUID FILLED ORAL DAILY
Status: DISCONTINUED | OUTPATIENT
Start: 2024-09-18 | End: 2024-09-20 | Stop reason: HOSPADM

## 2024-09-18 RX ORDER — ONDANSETRON 2 MG/ML
4 INJECTION INTRAMUSCULAR; INTRAVENOUS EVERY 6 HOURS PRN
Status: DISCONTINUED | OUTPATIENT
Start: 2024-09-18 | End: 2024-09-18

## 2024-09-18 RX ORDER — LOPERAMIDE HCL 2 MG
2 CAPSULE ORAL
Status: DISCONTINUED | OUTPATIENT
Start: 2024-09-18 | End: 2024-09-20 | Stop reason: HOSPADM

## 2024-09-18 RX ORDER — HYDROCORTISONE 25 MG/G
CREAM TOPICAL 3 TIMES DAILY PRN
Status: DISCONTINUED | OUTPATIENT
Start: 2024-09-18 | End: 2024-09-20 | Stop reason: HOSPADM

## 2024-09-18 RX ORDER — IBUPROFEN 400 MG/1
800 TABLET, FILM COATED ORAL EVERY 6 HOURS PRN
Status: DISCONTINUED | OUTPATIENT
Start: 2024-09-18 | End: 2024-09-20 | Stop reason: HOSPADM

## 2024-09-18 RX ORDER — METHYLERGONOVINE MALEATE 0.2 MG/ML
200 INJECTION INTRAVENOUS
Status: DISCONTINUED | OUTPATIENT
Start: 2024-09-18 | End: 2024-09-20 | Stop reason: HOSPADM

## 2024-09-18 RX ORDER — TRANEXAMIC ACID 10 MG/ML
1 INJECTION, SOLUTION INTRAVENOUS EVERY 30 MIN PRN
Status: DISCONTINUED | OUTPATIENT
Start: 2024-09-18 | End: 2024-09-20 | Stop reason: HOSPADM

## 2024-09-18 RX ORDER — ACETAMINOPHEN 325 MG/1
650 TABLET ORAL EVERY 4 HOURS PRN
Status: DISCONTINUED | OUTPATIENT
Start: 2024-09-18 | End: 2024-09-20 | Stop reason: HOSPADM

## 2024-09-18 RX ORDER — OXYTOCIN/0.9 % SODIUM CHLORIDE 30/500 ML
340 PLASTIC BAG, INJECTION (ML) INTRAVENOUS CONTINUOUS PRN
Status: DISCONTINUED | OUTPATIENT
Start: 2024-09-18 | End: 2024-09-20 | Stop reason: HOSPADM

## 2024-09-18 RX ORDER — ROPIVACAINE HYDROCHLORIDE 2 MG/ML
10 INJECTION, SOLUTION EPIDURAL; INFILTRATION; PERINEURAL ONCE
Status: DISCONTINUED | OUTPATIENT
Start: 2024-09-18 | End: 2024-09-18 | Stop reason: HOSPADM

## 2024-09-18 RX ORDER — NALBUPHINE HYDROCHLORIDE 10 MG/ML
2.5-5 INJECTION, SOLUTION INTRAMUSCULAR; INTRAVENOUS; SUBCUTANEOUS EVERY 6 HOURS PRN
Status: DISCONTINUED | OUTPATIENT
Start: 2024-09-18 | End: 2024-09-20 | Stop reason: HOSPADM

## 2024-09-18 RX ORDER — NALOXONE HYDROCHLORIDE 0.4 MG/ML
0.2 INJECTION, SOLUTION INTRAMUSCULAR; INTRAVENOUS; SUBCUTANEOUS
Status: DISCONTINUED | OUTPATIENT
Start: 2024-09-18 | End: 2024-09-20 | Stop reason: HOSPADM

## 2024-09-18 RX ORDER — MISOPROSTOL 200 UG/1
800 TABLET ORAL
Status: DISCONTINUED | OUTPATIENT
Start: 2024-09-18 | End: 2024-09-20 | Stop reason: HOSPADM

## 2024-09-18 RX ADMIN — Medication: at 03:27

## 2024-09-18 RX ADMIN — ACETAMINOPHEN 650 MG: 325 TABLET ORAL at 15:13

## 2024-09-18 RX ADMIN — IBUPROFEN 800 MG: 400 TABLET, FILM COATED ORAL at 22:09

## 2024-09-18 RX ADMIN — SODIUM CHLORIDE, POTASSIUM CHLORIDE, SODIUM LACTATE AND CALCIUM CHLORIDE: 600; 310; 30; 20 INJECTION, SOLUTION INTRAVENOUS at 03:45

## 2024-09-18 RX ADMIN — SODIUM CHLORIDE, POTASSIUM CHLORIDE, SODIUM LACTATE AND CALCIUM CHLORIDE: 600; 310; 30; 20 INJECTION, SOLUTION INTRAVENOUS at 05:53

## 2024-09-18 RX ADMIN — IBUPROFEN 800 MG: 400 TABLET, FILM COATED ORAL at 08:25

## 2024-09-18 RX ADMIN — IBUPROFEN 800 MG: 400 TABLET, FILM COATED ORAL at 15:13

## 2024-09-18 RX ADMIN — DOCUSATE SODIUM 100 MG: 100 CAPSULE, LIQUID FILLED ORAL at 15:13

## 2024-09-18 RX ADMIN — ACETAMINOPHEN 650 MG: 325 TABLET ORAL at 22:10

## 2024-09-18 RX ADMIN — PANTOPRAZOLE SODIUM 40 MG: 40 TABLET, DELAYED RELEASE ORAL at 20:08

## 2024-09-18 RX ADMIN — ROPIVACAINE HYDROCHLORIDE 10 ML: 2 INJECTION, SOLUTION EPIDURAL; INFILTRATION at 03:31

## 2024-09-18 ASSESSMENT — ACTIVITIES OF DAILY LIVING (ADL)
ADLS_ACUITY_SCORE: 18

## 2024-09-18 NOTE — LACTATION NOTE
Initial Lactation visit with Katheryn, LEONIDAS, and baby boy. Katheryn reports feeding is going ok so far. Baby has been sleepy at times. Discussed  recovery period and feeding expectations during this time. Katheryn is using a nipple shield for smoother nipples. Education provided on the use of a nipple shield. Help position baby in the foot ball hold on the left breast. Baby able to latch. Deep latch noted. Baby sleepy and unlatched. Discussed the feelings and looks of a deep latch. Discussed nipple shape immediately after baby unlatches. Discussed cluster feeding and when milk typically comes in.     Recommend unlimited, frequent breast feedings: At least 8 - 12 times every 24 hours. Recommended rooming in. Instructed in hand expression. Avoid pacifiers and supplementation with formula unless medically indicated. Explained benefits of holding baby skin on skin to help promote better breastfeeding outcomes. Katheryn has a pump for home use. Will revisit as needed.    Teresa Catalan RN, IBCLC

## 2024-09-18 NOTE — PLAN OF CARE
Delivered liveborn male via .  Placenta delivered intact.  Fundus firm, scant flow. Vss, afebrile.

## 2024-09-18 NOTE — PROGRESS NOTES
Progress Note  Called by RN for category 2 tracing.   Repetitive lates followed by decrease in variability.  Scalp lead had been placed ry  RN  IUPC placed.    Now improved variability with variable decles.   Pitocin off.   Cervix 4/95/-1 station    Gia Barba MD on 9/18/2024 at 5:52 AM

## 2024-09-18 NOTE — PROCEDURES
Delivery Note    Presented for induction of labor for gestational hypertension.   Cervical ripening performed with vaginal cytotec  AROM performed .   Received labor epidural for pain management.   Did have intermittent category 2 tracing with rapid cervical dilation.   Pushed well   , male  Placenta intact, 3 vessel.  Second degree laceration, repair with 3.o vicryl.  Periurethral laceration also repaired.    ml  Cord gases obtained.     No complications for labor and delivery.     Gia Barba MD on 2024 at 7:35 AM

## 2024-09-18 NOTE — PLAN OF CARE
Goal Outcome Evaluation:  Pt ambulated to the bathroom, unable to void.  Fundus firm, scant flow.  Pads changed and pt moved to PP room via wheelchair.  Report given to RN taking over.

## 2024-09-18 NOTE — PROGRESS NOTES
Progress Note    Now on pitocin 4 mu.   Reports contractions still mild    Cervix 1.5/70/-2/soft/mid  AROM performed.   Clear fluid.     Category 1 strip.     Continue to increase pitocin as needed.     Gia Barba MD on 9/18/2024 at 1:15 AM

## 2024-09-18 NOTE — PLAN OF CARE
Goal Outcome Evaluation:      Plan of Care Reviewed With: patient    Overall Patient Progress: improvingOverall Patient Progress: improving  VSS. BP WNL. Fundus firm, scant flow. Voiding independently. Pain controlled with Ibu/Tyl, ice and tucks. Benzocaine spray provided.Working on breastfeeding infant with a nipple shield. Infant fairly sleepy at the breast. Katheryn hand expressing.    Declined bath during day shift. Wants circ.

## 2024-09-18 NOTE — PROVIDER NOTIFICATION
09/17/24 2045   Provider Notification   Provider Name/Title Dr. Barba   Method of Notification Phone   Request Evaluate - Remote   Notification Reason Uterine Activity;Labor Status;Status Update;Pain     MD called unit for update. Ctx every 2-4 min. Cat 1 FHT's. Pt states mild cramping, feeling ctx every 10 min. New orders from provider to start pitocin at 2200.

## 2024-09-18 NOTE — PLAN OF CARE
0110- Dr. Barba at bedside for AROM. Clear, scant fluid.     0200- patient states ctx are getting more intense. Increased loss of FHT capture due to frequent movement. RN at bedside and can audibly hear heart tones with baseline of 125-130's. Mentioned use of FSE, but pt would like to have epidural/ is questioning placement. Education provided. Provider verbal orders for FSE if needed.     0300- Patient requesting epidural at this time. Breathing through ctx, unable to talk and visibly uncomfortable. Fluid bolus started.

## 2024-09-18 NOTE — ANESTHESIA PREPROCEDURE EVALUATION
"Anesthesia Pre-Procedure Evaluation    Patient: Katheryn Brennan   MRN: [id number] : 1995        Procedure :           History reviewed. No pertinent past medical history.   Past Surgical History:   Procedure Laterality Date     ENT SURGERY      Sinus and wisdom teetch     ORTHOPEDIC SURGERY Left     acl repair      No Known Allergies   Social History     Tobacco Use     Smoking status: Never     Smokeless tobacco: Never   Substance Use Topics     Alcohol use: Yes      Wt Readings from Last 1 Encounters:   22 69.9 kg (154 lb)        Anesthesia Evaluation   Pt has not had prior anesthetic         ROS/MED HX  ENT/Pulmonary:    (-) asthma   Neurologic:  - neg neurologic ROS     Cardiovascular:     (+)  - - PIH and Mild/mod  -  - -                                      METS/Exercise Tolerance:     Hematologic:     (+)  no anticoagulation therapy, no coagulopathy,             Musculoskeletal:       GI/Hepatic:     (+) GERD,                   Renal/Genitourinary:       Endo:       Psychiatric/Substance Use:       Infectious Disease:       Malignancy:       Other:          Physical Exam    Airway        Mallampati: II   TM distance: > 3 FB   Neck ROM: full     Respiratory Devices and Support         Dental  no notable dental history         Cardiovascular   cardiovascular exam normal          Pulmonary   pulmonary exam normal            OUTSIDE LABS:  CBC:   Lab Results   Component Value Date    WBC 8.0 2024    WBC 5.3 2022    HGB 12.6 2024    HGB 13.5 2022    HCT 36.6 2024    HCT 37.9 2022     (L) 2024     (L) 2024     BMP:   Lab Results   Component Value Date     2022    POTASSIUM 3.7 2022    CHLORIDE 104 2022    CO2 31 2022    BUN 11 2022    CR 0.82 2022    GLC 96 2022     COAGS: No results found for: \"PTT\", \"INR\", \"FIBR\"  POC: No results found for: \"BGM\", \"HCG\", \"HCGS\"  HEPATIC:   Lab Results "   Component Value Date    ALBUMIN 3.9 11/03/2022    PROTTOTAL 6.9 11/03/2022    ALT 25 09/16/2024    AST 32 09/16/2024    ALKPHOS 40 11/03/2022    BILITOTAL 0.9 11/03/2022     OTHER:   Lab Results   Component Value Date    KULDEEP 8.9 11/03/2022       Anesthesia Plan    ASA Status:  2       Anesthesia Type: Epidural.              Consents    Anesthesia Plan(s) and associated risks, benefits, and realistic alternatives discussed. Questions answered and patient/representative(s) expressed understanding.     - Discussed:     - Discussed with:  Patient            Postoperative Care            Comments:    Other Comments: Orders to manage the epidural infusion have been entered, and through coordination with the nurse, we will continute to manage and monitor the patient's labor epidural.  We will continuously be available to adjust as needed thruout the entire L&D process.          Deepak Nagy MD    I have reviewed the pertinent notes and labs in the chart from the past 30 days and (re)examined the patient.  Any updates or changes from those notes are reflected in this note.

## 2024-09-18 NOTE — PLAN OF CARE
Pitocin started at 2200. SVE at this time is 1.5/60/-2. Ctx on [place] are every 2-4 min. Mostly palpate mild, some moderate. Patient rates pain 3/10. Cat 1 FHT's. Difficulty tracing FHT's at times. Continuing to reposition US and [place].

## 2024-09-18 NOTE — PLAN OF CARE
0345- Patient got epidural.     0415- Has one spot on her right lower back that is still uncomfortable. Repositioned to right side, bolus button pressed.  Ctx every 2-3. Cat 1 FHT's. Occasional variable. Difficulty tracing FHT's. Continuing to reposition US and TOCO. RN continuously at bedside palpating ctx. Audible heart tones heard, not tracing well.     0446- Givens placed after patient became comfortable. Variable decels noted. SVE at this time is 4/80/-1.     0504- FSE placed due to difficulty tracing FHT's. Recurrent late and variable decels. Pitocin off at 0515. Dr. Barba notified of FHT strip at 0515 . Continuous reposition done.     Provider at bedside at 0530 to evaluate. SVE at this time is 5/90/. IUPC was placed by provider due to difficulty tracing ctx on TOCO. Provider in unit monitoring strip.     0555- positioned to thrones. Baby tolerating well for 20 min, then began to have recurrent deep variables.     0620- SVE per provider is 9/100/+1.     Patient complete at 0630. Started pushing at 0641. Provider at bedside for entirety of pushing. Deep variables noted with ctx. Patient pushing effectively with each ctx.     0710- Viable male infant born. APGARS 8,9. Delivery team at bedside due to fetal tones. Obtained venous blood gasses, unable to collect arterial.

## 2024-09-18 NOTE — ANESTHESIA PROCEDURE NOTES
Epidural catheter Procedure Note    Pre-Procedure   Staff -        Anesthesiologist:  Deepak Nagy MD       Performed By: anesthesiologist       Referred By: OB       Location: OB       Pre-Anesthestic Checklist: patient identified, IV checked, risks and benefits discussed, informed consent, monitors and equipment checked, pre-op evaluation and at physician/surgeon's request  Timeout:       Correct Patient: Yes        Correct Procedure: Yes        Correct Site: Yes        Correct Position: Yes   Procedure Documentation  Procedure: epidural catheter       Patient Position: sitting       Patient Prep/Sterile Barriers: sterile gloves, mask, patient draped       Skin prep: Betadine       Local skin infiltrated with mL of 1% lidocaine.        Insertion Site: L2-3, L3-4. (midline approach).       Technique: LORT saline        CECIL at 4 cm.       Needle Type: [employer] needle       Needle Gauge: 17.        Needle Length (Inches): 3.5           Catheter threaded easily.         3 cm epidural space.           # of attempts: 1 and  # of redirects:     Assessment/Narrative         Paresthesias: No.       Test dose of 3 mL lidocaine 1.5% w/ 1:200,000 epinephrine at.         Test dose negative, 3 minutes after injection, for signs of intravascular, subdural, or intrathecal injection.       Insertion/Infusion Method: LORT saline       Aspiration negative for Heme or CSF via Epidural Catheter.    Medication(s) Administered   0.2% Ropivacaine (Epidural) - EPIDURAL   10 mL - 9/18/2024 3:31:00 AM   Comments:  No complications. Sterile Dressing. Orders to manage the epidural infusion have been entered and, through coordination with the nurse, we will continue to manage and monitor the patient's labor epidural.  We will continuously be available to adjust as needed throughout the entire labor and delivery process.        FOR [facility] ([facility]) ONLY:   Pain Team Contact information: please page the Pain Team Via [employer]. Search  "\"Pain\". During daytime hours, please page the attending first. At night please page the resident first.      "

## 2024-09-19 PROBLEM — Z3A.37 37 WEEKS GESTATION OF PREGNANCY: Status: ACTIVE | Noted: 2024-09-19

## 2024-09-19 PROCEDURE — 250N000013 HC RX MED GY IP 250 OP 250 PS 637: Performed by: SPECIALIST

## 2024-09-19 PROCEDURE — 120N000012 HC R&B POSTPARTUM

## 2024-09-19 RX ADMIN — ACETAMINOPHEN 650 MG: 325 TABLET ORAL at 10:10

## 2024-09-19 RX ADMIN — PANTOPRAZOLE SODIUM 40 MG: 40 TABLET, DELAYED RELEASE ORAL at 20:06

## 2024-09-19 RX ADMIN — ACETAMINOPHEN 650 MG: 325 TABLET ORAL at 21:57

## 2024-09-19 RX ADMIN — IBUPROFEN 800 MG: 400 TABLET, FILM COATED ORAL at 03:52

## 2024-09-19 RX ADMIN — IBUPROFEN 800 MG: 400 TABLET, FILM COATED ORAL at 10:10

## 2024-09-19 RX ADMIN — DOCUSATE SODIUM 100 MG: 100 CAPSULE, LIQUID FILLED ORAL at 08:09

## 2024-09-19 RX ADMIN — IBUPROFEN 800 MG: 400 TABLET, FILM COATED ORAL at 21:57

## 2024-09-19 RX ADMIN — ACETAMINOPHEN 650 MG: 325 TABLET ORAL at 15:15

## 2024-09-19 RX ADMIN — IBUPROFEN 800 MG: 400 TABLET, FILM COATED ORAL at 15:15

## 2024-09-19 RX ADMIN — ACETAMINOPHEN 650 MG: 325 TABLET ORAL at 03:52

## 2024-09-19 ASSESSMENT — ACTIVITIES OF DAILY LIVING (ADL)
ADLS_ACUITY_SCORE: 18

## 2024-09-19 NOTE — ANESTHESIA POSTPROCEDURE EVALUATION
Patient: Katheryn Brennan    Procedure: * No procedures listed *       Anesthesia Type:  Epidural    Note:     Postop Pain Control: Uneventful            Sign Out: Well controlled pain   PONV: No   Neuro/Psych: Uneventful            Sign Out: Acceptable/Baseline neuro status   Airway/Respiratory: Uneventful            Sign Out: Acceptable/Baseline resp. status   CV/Hemodynamics: Uneventful            Sign Out: Acceptable CV status; No obvious hypovolemia; No obvious fluid overload   Other NRE: NONE   DID A NON-ROUTINE EVENT OCCUR? No           Last vitals:  Vitals:    09/18/24 2255 09/19/24 0352 09/19/24 1013   BP: 113/68 117/68 115/68   Pulse:   62   Resp: 16 16 16   Temp: 36.8  C (98.3  F)  36.8  C (98.3  F)   SpO2:          Electronically Signed By: Dc Albright MD  September 19, 2024  4:36 PM

## 2024-09-19 NOTE — PROGRESS NOTES
[facility]   Post-Partum Progress Note          Assessment and Plan:    Assessment:   Post-partum day #1  Induced vaginal delivery secondary to pregnancy-induced hypertension  L&D complications: Intrauterine pregnancy at 37+5 weeks gestation  Pregnancy induced hypertension      Doing well.      Plan:   Ambulation encouraged  Pain control measures as needed  Reportable signs and symptoms dicussed with the patient  Discussed probable rise in BPs, restart of meds, possibility of preE           Interval History:     Doing well.  Pain is well-controlled.  No fevers.  No history of foul-smelling vaginal discharge.  Good appetite.  Denies chest pain, shortness of breath, nausea or vomiting.  Vaginal bleeding is similar to a heavy menstrual flow.  Ambulatory.  Breastfeeding well.          Significant Problems:      Patient Active Problem List   Diagnosis    Gestational hypertension, third trimester    Labor and delivery, indication for care    Indication for care in labor or delivery    Single liveborn infant delivered vaginally             Review of Systems:    The patient denies any chest pain, shortness of breath, excessive pain, fever, chills, purulent drainage from the wound, nausea or vomiting.          Medications:   All medications related to the patient's surgery have been reviewed          Physical Exam:   All vitals stable  Blood pressure 117/68, pulse 73, temperature 98.3  F (36.8  C), temperature source Oral, resp. rate 16, SpO2 98%, unknown if currently breastfeeding.  Uterine fundus is firm, non-tender and at the level of the umbilicus          Data:   All laboratory data related to this surgery reviewed  Lab Results   Component Value Date    HGB 12.6 09/16/2024       MARCELINO BALDERRAMA MD

## 2024-09-19 NOTE — PLAN OF CARE
Goal Outcome Evaluation:      Plan of Care Reviewed With: patient, spouse    Overall Patient Progress: improvingOverall Patient Progress: improving     Vitals within defined limits. BPs 110-120s/60s. Denies headache, vision changes, epigastric pain. Fundus firm. Lochia scant. Using Tylenol/Motrin for perineal soreness with good relief. Using ice packs/tucks and Benzocaine spray for comfort. Voiding without issues. Up ad carrol. Breastfeeding  per cues with nipple shield. Partial latch assistance provided overnight, worked on football hold and making sure  has a good latch. Encouraged to call with questions/concerns. Spouse at bedside and supportive.

## 2024-09-19 NOTE — LACTATION NOTE
Follow up Lactation visit with Katheryn, significant other Efren & eveline Fields. Katheryn reports feeding is going ok, but shared baby's latch has been more pinchy today. She'd asked for feeding assistance with this feed due to pinching sensation. She's been using the nipple shield due to difficulty latching. We worked on feeding without the shield and he was able to latch deeply, with some assistance, and Katheryn felt latch was more comfortable. Reviewed general positioning guidance, reviewed hand placement for a deep C hold to breast, and encouraged to call for feeding assistance as needed. Discussed continuing to feeding without shield if able, if baby doesn't need it to latch.    Discussed cluster feeding, what it is and when to expect it, The Second Night, satiety cues, feeding cues, and reviewed Feeding Log for home use. Encouraged to review Breastfeeding section in Your Guide to Postpartum & [place] [facility].    Reviewed milk supply and engorgement. Reviewed typical timeline of milk supply initiation and progression over first 3-5 days postpartum. Discussed comfort measures for engorgement, plugged duct treatment, and warning signs of breast infection. General questions answered regarding pumping, when it's helpful and necessary. Reviewed general recommendation to wait to start pumping until breastfeeding is well established unless there are feeding difficulties or engorgement not relieved by feeding baby or hand expression. Discussed introducing a bottle and recommendation to wait for bottle introduction for 3-4 weeks unless baby needs to supplement for medical reasons.    Feeding plan: Recommend unlimited, frequent breast feedings: At least 8 - 12 times every 24 hours. Avoid pacifiers and supplementation with formula unless medically indicated. Encouraged use of feeding log and to record feedings, and void/stool patterns. Katheryn has a breast pump for home use. Reviewed outpatient lactation resources.  Katheryn & Efren appreciative of visit.    Rosie Saba, RN, BSN, MNN, IBCLC

## 2024-09-20 VITALS
TEMPERATURE: 98.5 F | RESPIRATION RATE: 16 BRPM | OXYGEN SATURATION: 98 % | DIASTOLIC BLOOD PRESSURE: 76 MMHG | WEIGHT: 207.8 LBS | BODY MASS INDEX: 32.55 KG/M2 | HEART RATE: 77 BPM | SYSTOLIC BLOOD PRESSURE: 120 MMHG

## 2024-09-20 PROBLEM — O13.9 GESTATIONAL HYPERTENSION: Status: ACTIVE | Noted: 2024-09-20

## 2024-09-20 PROCEDURE — 250N000013 HC RX MED GY IP 250 OP 250 PS 637: Performed by: SPECIALIST

## 2024-09-20 RX ORDER — AMOXICILLIN 250 MG
1 CAPSULE ORAL 2 TIMES DAILY PRN
Qty: 60 TABLET | Refills: 1 | Status: SHIPPED | OUTPATIENT
Start: 2024-09-20

## 2024-09-20 RX ORDER — BISACODYL 10 MG
10 SUPPOSITORY, RECTAL RECTAL DAILY PRN
Qty: 5 SUPPOSITORY | Refills: 0 | Status: SHIPPED | OUTPATIENT
Start: 2024-09-20

## 2024-09-20 RX ORDER — ACETAMINOPHEN 325 MG/1
650 TABLET ORAL EVERY 4 HOURS PRN
Qty: 60 TABLET | Refills: 1 | Status: SHIPPED | OUTPATIENT
Start: 2024-09-20

## 2024-09-20 RX ORDER — AMOXICILLIN 250 MG
1 CAPSULE ORAL 2 TIMES DAILY PRN
Status: DISCONTINUED | OUTPATIENT
Start: 2024-09-20 | End: 2024-09-20 | Stop reason: HOSPADM

## 2024-09-20 RX ORDER — IBUPROFEN 800 MG/1
800 TABLET, FILM COATED ORAL EVERY 6 HOURS PRN
Qty: 60 TABLET | Refills: 1 | Status: SHIPPED | OUTPATIENT
Start: 2024-09-20

## 2024-09-20 RX ADMIN — DOCUSATE SODIUM 100 MG: 100 CAPSULE, LIQUID FILLED ORAL at 09:09

## 2024-09-20 RX ADMIN — ACETAMINOPHEN 650 MG: 325 TABLET ORAL at 11:28

## 2024-09-20 RX ADMIN — IBUPROFEN 800 MG: 400 TABLET, FILM COATED ORAL at 05:31

## 2024-09-20 RX ADMIN — ACETAMINOPHEN 650 MG: 325 TABLET ORAL at 05:31

## 2024-09-20 RX ADMIN — IBUPROFEN 800 MG: 400 TABLET, FILM COATED ORAL at 11:28

## 2024-09-20 ASSESSMENT — ACTIVITIES OF DAILY LIVING (ADL)
ADLS_ACUITY_SCORE: 18

## 2024-09-20 NOTE — PLAN OF CARE
Patient doing well. VSS. Pain controlled with tylenol and ibuprofen.. Voiding without difficulty. Working on breastfeeding  and  cares. Advised to call with questions or concerns. Will continue to monitor.

## 2024-09-20 NOTE — DISCHARGE SUMMARY
[facility] Discharge Summary    Katheryn Brennan MRN# [id number]   Age: 29 year old YOB: 1995     Date of Admission:  9/16/2024  Date of Discharge::  9/20/2024  Admitting Physician:  Gia Barba MD  Discharge Physician:  Wyatt Mondragon MD     Home clinic: [facility]          Admission Diagnoses:   Pregnancy at 37W 3D  Gestational hypertension          Discharge Diagnosis:     PPD #2 Induced vaginal delivery secondary to gestational hypertension          Procedures:     Procedure(s): Repair of second degree perineal laceration       No other procedures performed during this admission           Medications Prior to Admission:     Medications Prior to Admission   Medication Sig Dispense Refill Last Dose    fexofenadine (ALLEGRA) 180 MG tablet Take 180 mg by mouth daily   9/16/2024    [DISCONTINUED] labetalol (NORMODYNE) 100 MG tablet Take 100 mg by mouth 3 times daily.   9/16/2024    [DISCONTINUED] omeprazole (PRILOSEC) 10 MG DR capsule Take 10 mg by mouth daily.   9/15/2024             Discharge Medications:     Current Discharge Medication List        START taking these medications    Details   acetaminophen (TYLENOL) 325 MG tablet Take 2 tablets (650 mg) by mouth every 4 hours as needed for mild pain or fever (greater than or equal to 38 C /100.4 F (oral) or 38.5 C/ 101.4 F (core).).  Qty: 60 tablet, Refills: 1    Associated Diagnoses: Single liveborn infant delivered vaginally      bisacodyl (DULCOLAX) 10 MG suppository Place 1 suppository (10 mg) rectally daily as needed for constipation.  Qty: 5 suppository, Refills: 0    Associated Diagnoses: Single liveborn infant delivered vaginally      ibuprofen (ADVIL/MOTRIN) 800 MG tablet Take 1 tablet (800 mg) by mouth every 6 hours as needed for other (cramping).  Qty: 60 tablet, Refills: 1    Associated Diagnoses: Single liveborn infant delivered vaginally      senna-docusate (SENOKOT-S/PERICOLACE) 8.6-50  MG tablet Take 1 tablet by mouth 2 times daily as needed for constipation.  Qty: 60 tablet, Refills: 1    Associated Diagnoses: Single liveborn infant delivered vaginally           CONTINUE these medications which have NOT CHANGED    Details   fexofenadine (ALLEGRA) 180 MG tablet Take 180 mg by mouth daily           STOP taking these medications       labetalol (NORMODYNE) 100 MG tablet Comments:   Reason for Stopping:         omeprazole (PRILOSEC) 10 MG DR capsule Comments:   Reason for Stopping:                     Consultations:   No consultations were requested during this admission          Brief History of Labor:   Patient admitted at 37W 3D gestation for induction of labor secondary to gestational hypertension. Labs normal on admission.  She received vaginal misoprostol followed by Pitocin and AROM.  Labor progressed well.  She received an epidural.  She proceeded to complete and pushing.  Vaginal delivery of a male.  Repair of 2nd degree laceration and periurethral laceration.  Please see delivery note for further details.           Hospital Course:   The patient's hospital course was unremarkable.  On discharge, her pain was well controlled. Vaginal bleeding is similar to peak menstrual flow.  Voiding without difficulty.  Ambulating well and tolerating a normal diet.  No fever.  Breastfeeding well.  Infant is stable.  No bowel movement yet.  She was discharged on post-partum day #2.    Post-partum hemoglobin:   Hemoglobin   Date Value Ref Range Status   09/16/2024 12.6 11.7 - 15.7 g/dL Final             Discharge Instructions and Follow-Up:     Discharge diet: Regular   Discharge activity: Pelvic rest: abstain from intercourse and do not use tampons for 6 week(s)   Discharge follow-up: Follow-up with [facility] next week to review blood pressures and with Dr. Mckeon in 6 weeks for routine postpartum visit   Wound care: Drink plenty of fluids  Ice to area for comfort           Discharge  Disposition:     Discharged to home      Attestation:  Total time: 30 minutes    Wyatt Mondragon MD

## 2024-09-20 NOTE — PROGRESS NOTES
[facility] Post-Partum Progress Note          Assessment and Plan:    Assessment:   Post-partum day #2  Induced vaginal delivery at 37 weeks secondary to gestational hypertension  L&D complications: None      Doing well.  No excessive bleeding  Pain well-controlled.  No headaches, vision changes or epigastric pain.  Blood pressures all normal post delivery (106-125/55-74). Not on any antihypertensives.      Plan:   Discharge later today  Continue to monitor blood pressures once at home.           Interval History:   Doing well.  Pain is well-controlled.  No fevers.  No history of foul-smelling vaginal discharge.  Good appetite.  Denies chest pain, shortness of breath, nausea or vomiting.  Vaginal bleeding is similar to a heavy menstrual flow.  Ambulatory.  Breastfeeding well.          Significant Problems:    None          Review of Systems:    The patient denies any chest pain, shortness of breath, excessive pain, fever, chills, purulent drainage from the wound, nausea or vomiting.          Medications:   All medications related to the patient's surgery have been reviewed          Physical Exam:   All vitals stable  Patient Vitals for the past 72 hrs:   BP Temp Temp src Pulse Resp SpO2   09/20/24 0100 125/74 98.5  F (36.9  C) Oral 60 16 --   09/19/24 1600 119/71 98.4  F (36.9  C) Oral 56 16 --   09/19/24 1013 115/68 98.3  F (36.8  C) Oral 62 16 --   09/19/24 0352 117/68 -- -- -- 16 --   09/18/24 2255 113/68 98.3  F (36.8  C) Oral -- 16 --   09/18/24 2000 120/69 98.3  F (36.8  C) Oral -- 16 --   09/18/24 1500 119/71 98  F (36.7  C) Oral -- 16 --   09/18/24 0900 125/62 -- -- -- -- --   09/18/24 0845 125/58 -- -- -- -- --   09/18/24 0830 109/59 -- -- -- -- --   09/18/24 0815 114/62 -- -- -- -- --   09/18/24 0800 108/53 -- -- -- -- --   09/18/24 0747 115/64 -- -- -- -- --   09/18/24 0745 115/64 -- -- -- -- --   09/18/24 0730 107/55 -- -- -- -- --   09/18/24 0715 106/56 98  F (36.7  C)  Temporal -- 16 --   09/18/24 0713 106/56 -- -- -- -- --   09/18/24 0708 -- -- -- -- -- 98 %   09/18/24 0703 -- -- -- -- -- 98 %   09/18/24 0629 107/52 -- -- -- -- --   09/18/24 0615 -- 97.6  F (36.4  C) Temporal -- -- --   09/18/24 0558 110/60 -- -- -- -- 93 %   09/18/24 0553 -- -- -- -- -- 97 %   09/18/24 0549 101/51 -- -- -- -- --   09/18/24 0515 124/73 -- -- -- -- --   09/18/24 0510 -- 98  F (36.7  C) -- -- -- --   09/18/24 0410 129/66 97.5  F (36.4  C) -- -- -- --   09/18/24 0405 128/74 -- -- -- -- --   09/18/24 0400 -- -- -- -- -- 98 %   09/18/24 0355 124/67 -- -- -- -- 97 %   09/18/24 0350 128/66 -- -- -- -- 98 %   09/18/24 0345 125/62 -- -- -- -- 98 %   09/18/24 0340 136/65 -- -- -- -- 98 %   09/18/24 0335 -- -- -- -- -- 97 %   09/18/24 0330 136/78 -- -- -- -- 98 %   09/18/24 0325 -- -- -- -- -- 99 %   09/18/24 0320 -- -- -- -- -- 99 %   09/18/24 0220 -- 97.4  F (36.3  C) Temporal -- -- --   09/18/24 0110 -- 97.9  F (36.6  C) Temporal -- -- --   09/18/24 0035 93/60 -- -- -- -- --   09/17/24 2130 105/60 -- -- -- -- --   09/17/24 2038 129/65 -- -- -- -- --   09/17/24 1620 119/73 97.9  F (36.6  C) Temporal -- 16 --   09/17/24 1400 121/79 -- -- -- -- --   09/17/24 1354 121/79 97.8  F (36.6  C) Esophageal 73 18 --   09/17/24 0923 124/77 97.7  F (36.5  C) Temporal -- 16 --     Uterine fundus is firm, non-tender and at the level of the umbilicus          Data:   All laboratory data related to this surgery reviewed  Lab Results   Component Value Date    WBC 8.0 09/16/2024    WBC 5.3 11/03/2022    HGB 12.6 09/16/2024    HGB 13.5 11/03/2022    HCT 36.6 09/16/2024    HCT 37.9 11/03/2022     (L) 09/18/2024     (L) 09/16/2024     11/03/2022     11/03/2022    POTASSIUM 3.7 11/03/2022    CHLORIDE 104 11/03/2022    CO2 31 11/03/2022    BUN 11 11/03/2022    CR 0.82 11/03/2022    GLC 96 11/03/2022    AST 32 09/16/2024    AST 12 11/03/2022    ALT 25 09/16/2024    ALT 29 11/03/2022    ALKPHOS 40  11/03/2022    BILITOTAL 0.9 11/03/2022     No imaging studies have been ordered    Wyatt Mondragon MD

## 2024-09-20 NOTE — PLAN OF CARE
Goal Outcome Evaluation:      Plan of Care Reviewed With: patient    Overall Patient Progress: improvingOverall Patient Progress: improving    Vital signs stable. Postpartum assessment WDL. Pain controlled with tylenol and ibuprofen. Patient up ad carrol and voiding without difficulty. Breastfeeding on cue with minimal staff assist.  Nipples bruised, cracked and blistered.  Started using hydrogels and educated to wipe off with washcloth before feeds. Patient and infant bonding well. Plan of care ongoing.

## 2024-09-20 NOTE — DISCHARGE INSTRUCTIONS
Warning Signs after Having a Baby    Keep this paper on your fridge or somewhere else where you can see it.    Call your provider if you have any of these symptoms up to 12 weeks after having your baby.    Thoughts of hurting yourself or your baby  Pain in your chest or trouble breathing  Severe headache not helped by pain medicine  Eyesight concerns (blurry vision, seeing spots or flashes of light, other changes to eyesight)  Fainting, shaking or other signs of a seizure    Call 9-1-1 if you feel that it is an emergency.     The symptoms below can happen to anyone after giving birth. They can be very serious. Call your provider if you have any of these warning signs.    My provider s phone number: _______________________    Losing too much blood (hemorrhage)    Call your provider if you soak through a pad in less than an hour or pass blood clots bigger than a golf ball. These may be signs that you are bleeding too much.    Blood clots in the legs or lungs    After you give birth, your body naturally clots its blood to help prevent blood loss. Sometimes this increased clotting can happen in other areas of the body, like the legs or lungs. This can block your blood flow and be very dangerous.     Call your provider if you:  Have a red, swollen spot on the back of your leg that is warm or painful when you touch it.   Are coughing up blood.     Infection    Call your provider if you have any of these symptoms:  Fever of 100.4 F (38 C) or higher.  Pain or redness around your stitches if you had an incision.   Any yellow, white, or green fluid coming from places where you had stitches or surgery.    Mood Problems (postpartum depression)    Many people feel sad or have mood changes after having a baby. But for some people, these mood swings are worse.     Call your provider right away if you feel so anxious or nervous that you can't care for yourself or your baby.    Preeclampsia (high blood pressure)    Even if you  didn't have high blood pressure when you were pregnant, you are at risk for the high blood pressure disease called preeclampsia. This risk can last up to 12 weeks after giving birth.     Call your provider if you have:   Pain on your right side under your rib cage  Sudden swelling in the hands and face    Remember: You know your body. If something doesn't feel right, get medical help.     For informational purposes only. Not to replace the advice of your health care provider. Copyright 2020 [facility]. All rights reserved. Clinically reviewed by Jeanette Marroquin, RNC-OB, MSN. WonderHow[employer] [id number] - Rev 02/23.

## 2024-09-20 NOTE — PROGRESS NOTES
Patient discharged to home in stable condition. Patient verbalizes understanding of discharge instructions and when to call. PT given RX's to fill at her pharmacy. Patient discharged in WC with infant in arms.

## 2024-09-20 NOTE — LACTATION NOTE
"Discharge visit with Katheryn,  Efren and baby Diamond.    Katheryn has been struggling with breastfeeding, she has very sore nipples to the point she elected to pump instead of bringing infant to breast. She shares she has enjoyed pumping because she can \"see exactly the amount\" he's getting with feedings. Katheryn pumped 20 ml with her last pump session. Observed Katheryn bottle feeding Fields her EBM. Showed her how to pace his bottle so he can still develop his suck/swallow breath patterning.    Offered online exclusive pumping resources for Katheryn. She said she may want to try breastfeeding again, so I strongly suggested follow-up with Aidan Bell lactation consultant to assist with latch.    Katheryn has an Amaya stride, since she is thinking about pumping/bottling, encouraged her to purchase a traditional double electric pump to build her milk supply. I measured Katheryn for a 21mm flange. She states she will be picking up a Spectra S1 today.    Recommended to utilize our \"Guide to Postpartum and [facility]\" handbook as great resource for discharge.     Feeding plan recommendations: provide unlimited, on-demand feedings: At least 8-12 times/24 hours (reviewed early feeding cues). Encouraged on-going use of a feeding log or baljit to record feedings along with void/stool patterns. Provided baljit suggestions and also handout with expected milk volumes from day 1 --day 14.   Follow up with Pediatrician as requested and encouraged lactation follow up. Reviewed [facility] outpatient lactation resources. Appreciative of visit.    Celia Martinez RN, IBCLC          "

## 2024-12-01 ENCOUNTER — HEALTH MAINTENANCE LETTER (OUTPATIENT)
Age: 29
End: 2024-12-01